# Patient Record
Sex: FEMALE | Race: WHITE
[De-identification: names, ages, dates, MRNs, and addresses within clinical notes are randomized per-mention and may not be internally consistent; named-entity substitution may affect disease eponyms.]

---

## 2019-12-11 ENCOUNTER — HOSPITAL ENCOUNTER (EMERGENCY)
Dept: HOSPITAL 8 - ED | Age: 44
Discharge: HOME | End: 2019-12-11
Payer: MEDICAID

## 2019-12-11 VITALS — HEIGHT: 62 IN | WEIGHT: 148.15 LBS | BODY MASS INDEX: 27.26 KG/M2

## 2019-12-11 VITALS — SYSTOLIC BLOOD PRESSURE: 122 MMHG | DIASTOLIC BLOOD PRESSURE: 71 MMHG

## 2019-12-11 DIAGNOSIS — M25.521: ICD-10-CM
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
## 2019-12-11 DIAGNOSIS — S16.1XXA: Primary | ICD-10-CM
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
## 2019-12-11 PROCEDURE — 72110 X-RAY EXAM L-2 SPINE 4/>VWS: CPT

## 2019-12-11 PROCEDURE — 72220 X-RAY EXAM SACRUM TAILBONE: CPT

## 2019-12-11 PROCEDURE — 72050 X-RAY EXAM NECK SPINE 4/5VWS: CPT

## 2019-12-11 PROCEDURE — 99283 EMERGENCY DEPT VISIT LOW MDM: CPT

## 2019-12-11 NOTE — NUR
LATE ENTRY: PT BECAME VERBALLY UPSET DURING STAY D/T INABILITY TO LOCATE PT 
MOTHER D/T GIVING WRONG LAST NAME. PT MOTHER WAS LATER FOUND ON UNIT WITH A 
DIFFERENT LAST NAME THAN ORIGINALLY STATED. PT BECAME HYSTERICAL YELLING AT 
NURSING STAFF AND REGISTRATION STAFF UNTIL MOTHER WAS FOUND WITH DIFFERENT LAST 
NAME. PT SHOWED BACK TO ROOM FOR RESULTS AND THEN DISCHARGED HOME. PT CONTINUED 
TO BLAME STAFF FOR NOT LOCATING HER MOTHER, NOT UNDERSTANDING MOTHER COULDNT BE 
LOCATED WITH THE WRONG LAST NAME.